# Patient Record
Sex: FEMALE | Race: WHITE | NOT HISPANIC OR LATINO | Employment: FULL TIME | URBAN - NONMETROPOLITAN AREA
[De-identification: names, ages, dates, MRNs, and addresses within clinical notes are randomized per-mention and may not be internally consistent; named-entity substitution may affect disease eponyms.]

---

## 2021-04-15 DIAGNOSIS — Z23 ENCOUNTER FOR IMMUNIZATION: ICD-10-CM

## 2025-03-24 ENCOUNTER — OFFICE VISIT (OUTPATIENT)
Dept: URGENT CARE | Facility: CLINIC | Age: 45
End: 2025-03-24
Payer: COMMERCIAL

## 2025-03-24 VITALS
WEIGHT: 120 LBS | TEMPERATURE: 98.4 F | SYSTOLIC BLOOD PRESSURE: 101 MMHG | OXYGEN SATURATION: 97 % | RESPIRATION RATE: 18 BRPM | HEIGHT: 64 IN | HEART RATE: 85 BPM | DIASTOLIC BLOOD PRESSURE: 69 MMHG | BODY MASS INDEX: 20.49 KG/M2

## 2025-03-24 DIAGNOSIS — B34.9 VIRAL SYNDROME: Primary | ICD-10-CM

## 2025-03-24 PROCEDURE — 99203 OFFICE O/P NEW LOW 30 MIN: CPT | Performed by: PHYSICIAN ASSISTANT

## 2025-03-24 RX ORDER — NORETHINDRONE ACETATE/ETHINYL ESTRADIOL AND FERROUS FUMARATE 1MG-20(24)
1 KIT ORAL DAILY
COMMUNITY
Start: 2025-03-13

## 2025-03-24 RX ORDER — LORAZEPAM 0.5 MG/1
0.5 TABLET ORAL 2 TIMES DAILY PRN
COMMUNITY
Start: 2025-01-16

## 2025-03-24 RX ORDER — OSELTAMIVIR PHOSPHATE 75 MG/1
75 CAPSULE ORAL EVERY 12 HOURS SCHEDULED
Qty: 10 CAPSULE | Refills: 0 | Status: SHIPPED | OUTPATIENT
Start: 2025-03-24 | End: 2025-03-29

## 2025-03-24 NOTE — LETTER
March 24, 2025     Patient: Precious Deutsch   YOB: 1980   Date of Visit: 3/24/2025       To Whom It May Concern:    It is my medical opinion that Precious Deutsch may return to work on 3/31/2025 or sooner if symptoms improve .    If you have any questions or concerns, please don't hesitate to call.         Sincerely,        Zayda Najera PA-C    CC: No Recipients

## 2025-03-24 NOTE — PROGRESS NOTES
[facility]        NAME: Precious Deutsch is a 44 y.o. female  : 1980    MRN: [id number]  DATE: 2025  TIME: 1:08 PM    Assessment and Plan   Viral syndrome [B34.9]  1. Viral syndrome  oseltamivir (TAMIFLU) 75 mg capsule          Results    Declined lab covid/flu test.     Patient Instructions     Assessment & Plan    Viral symptoms may last for a total of 1-3 weeks. Symptoms typically start with a sore throat and progress to include sinus pain/pressure, runny nose (yellow/green), and congestion/cough. The yellow/green color does not necessarily indicate a bacterial sinus infection. If your sinus symptoms worsen on day 10-14, you may want to consider re-evaluation for a possible secondary bacterial sinus infection. Coughs are typically most bothersome the first 1-2 weeks. Coughs frequently linger for 4-6 weeks. However, have your lungs re-evaluated if you develop sudden worsening cough, shortness or breath or chest discomfort.       Vitamin D3 2000 IU daily  Vitamin C 1000mg twice per day  Some studies suggest that Zinc 12.5-15mg every 2 hours while awake x 5 days may shorten the duration cold symptoms by 1-2 days.   Fluids and rest  Nasal saline spray (Extra Strength) 3 drops in each nostril 4x per day may shorten the duration of illness by 1-2 days and help prevent spread.  Afrin if severe congestion (do not use for more than 3 days)  Over the counter cold medication as needed (EX: Mucinex DM, Sudafed I.e. pseudoephedrine, Tylenol, Flonase)  Sinus Rinses (use distilled water only and carefully follow instructions)  Salt water gargles and chloraseptic spray  Throat Coat Tea (herbal licorice root tea for sore throat-add honey unless diabetic)  Warm compresses over sinuses  Steam treatment (utilize proper safety precautions when in contact with hot water/steam)    Follow up with PCP in 3-5 days.  Proceed to  ER if symptoms worsen.    If tests have been performed at [facility], our office  will contact you with results if changes need to be made to the care plan discussed with you at the visit.  You can review your full results on [facility].    Chief Complaint     Chief Complaint   Patient presents with    Fatigue    Generalized Body Aches    Cough    Fever     Started last night         History of Present Illness     History of Present Illness      URI   This is a new problem. The current episode started yesterday. Associated symptoms include congestion and coughing. Pertinent negatives include no abdominal pain, diarrhea, ear pain, headaches, nausea, rash, rhinorrhea, sinus pain, sneezing, sore throat, vomiting or wheezing. She has tried nothing for the symptoms.       Review of Systems   Review of Systems   Constitutional:  Positive for chills, fatigue and fever (tactile).   HENT:  Positive for congestion. Negative for ear discharge, ear pain, postnasal drip, rhinorrhea, sinus pressure, sinus pain, sneezing, sore throat and trouble swallowing.    Respiratory:  Positive for cough. Negative for chest tightness, shortness of breath and wheezing.    Gastrointestinal:  Negative for abdominal pain, diarrhea, nausea and vomiting.   Musculoskeletal:  Positive for myalgias.   Skin:  Negative for rash.   Neurological:  Negative for light-headedness and headaches.         Current Medications       Current Outpatient Medications:     Destiny 24 FE 1-20 MG-MCG(24) per tablet, Take 1 tablet by mouth in the morning, Disp: , Rfl:     LORazepam (ATIVAN) 0.5 mg tablet, Take 0.5 mg by mouth 2 (two) times a day as needed for anxiety, Disp: , Rfl:     oseltamivir (TAMIFLU) 75 mg capsule, Take 1 capsule (75 mg total) by mouth every 12 (twelve) hours for 5 days, Disp: 10 capsule, Rfl: 0    Current Allergies     Allergies as of 03/24/2025 - never reviewed   Allergen Reaction Noted    Sulfa antibiotics Hives 03/24/2025            The following portions of the patient's history were reviewed and updated as  "appropriate: allergies, current medications, past family history, past medical history, past social history, past surgical history and problem list.     History reviewed. No pertinent past medical history.    Past Surgical History:   Procedure Laterality Date     SECTION         No family history on file.      Medications have been verified.        Objective   /69   Pulse 85   Temp 98.4 °F (36.9 °C)   Resp 18   Ht 5' 4\" (1.626 m)   Wt 54.4 kg (120 lb)   LMP 2025 (Exact Date)   SpO2 97%   BMI 20.60 kg/m²   Patient's last menstrual period was 2025 (exact date).       Physical Exam     Physical Exam  Vitals reviewed.   Constitutional:       General: She is not in acute distress.     Appearance: She is well-developed. She is not diaphoretic.   HENT:      Head: Normocephalic.      Right Ear: Tympanic membrane, ear canal and external ear normal.      Left Ear: Tympanic membrane, ear canal and external ear normal.      Nose: Nose normal.      Mouth/Throat:      Pharynx: No oropharyngeal exudate or posterior oropharyngeal erythema.   Eyes:      Conjunctiva/sclera: Conjunctivae normal.   Cardiovascular:      Rate and Rhythm: Normal rate and regular rhythm.      Heart sounds: Normal heart sounds. No murmur heard.     No friction rub. No gallop.   Pulmonary:      Effort: Pulmonary effort is normal. No respiratory distress.      Breath sounds: Normal breath sounds. No wheezing, rhonchi or rales.   Lymphadenopathy:      Cervical: No cervical adenopathy.   Skin:     General: Skin is warm.   Neurological:      Mental Status: She is alert and oriented to person, place, and time.   Psychiatric:         Behavior: Behavior normal.         Thought Content: Thought content normal.         Judgment: Judgment normal.                     "

## 2025-03-24 NOTE — PATIENT INSTRUCTIONS
Viral symptoms may last for a total of 1-3 weeks. Symptoms typically start with a sore throat and progress to include sinus pain/pressure, runny nose (yellow/green), and congestion/cough. The yellow/green color does not necessarily indicate a bacterial sinus infection. If your sinus symptoms worsen on day 10-14, you may want to consider re-evaluation for a possible secondary bacterial sinus infection. Coughs are typically most bothersome the first 1-2 weeks. Coughs frequently linger for 4-6 weeks. However, have your lungs re-evaluated if you develop sudden worsening cough, shortness or breath or chest discomfort.       Vitamin D3 2000 IU daily  Vitamin C 1000mg twice per day  Some studies suggest that Zinc 12.5-15mg every 2 hours while awake x 5 days may shorten the duration cold symptoms by 1-2 days.   Fluids and rest  Nasal saline spray (Extra Strength) 3 drops in each nostril 4x per day may shorten the duration of illness by 1-2 days and help prevent spread.  Afrin if severe congestion (do not use for more than 3 days)  Over the counter cold medication as needed (EX: Mucinex DM, Sudafed I.e. pseudoephedrine, Tylenol, Flonase)  Sinus Rinses (use distilled water only and carefully follow instructions)  Salt water gargles and chloraseptic spray  Throat Coat Tea (herbal licorice root tea for sore throat-add honey unless diabetic)  Warm compresses over sinuses  Steam treatment (utilize proper safety precautions when in contact with hot water/steam)    Follow up with PCP in 3-5 days.  Proceed to  ER if symptoms worsen.    If tests have been performed at [facility], our office will contact you with results if changes need to be made to the care plan discussed with you at the visit.  You can review your full results on [facility] MyChart.